# Patient Record
Sex: MALE | Race: WHITE | NOT HISPANIC OR LATINO | Employment: FULL TIME | URBAN - METROPOLITAN AREA
[De-identification: names, ages, dates, MRNs, and addresses within clinical notes are randomized per-mention and may not be internally consistent; named-entity substitution may affect disease eponyms.]

---

## 2023-10-16 ENCOUNTER — APPOINTMENT (OUTPATIENT)
Dept: RADIOLOGY | Facility: HOSPITAL | Age: 43
End: 2023-10-16

## 2023-10-16 ENCOUNTER — HOSPITAL ENCOUNTER (EMERGENCY)
Facility: HOSPITAL | Age: 43
Discharge: HOME | End: 2023-10-17

## 2023-10-16 DIAGNOSIS — N20.1 RIGHT URETERAL STONE: Primary | ICD-10-CM

## 2023-10-16 PROCEDURE — 83605 ASSAY OF LACTIC ACID: CPT | Performed by: PHYSICIAN ASSISTANT

## 2023-10-16 PROCEDURE — 36415 COLL VENOUS BLD VENIPUNCTURE: CPT | Performed by: PHYSICIAN ASSISTANT

## 2023-10-16 PROCEDURE — 85025 COMPLETE CBC W/AUTO DIFF WBC: CPT | Performed by: PHYSICIAN ASSISTANT

## 2023-10-16 PROCEDURE — 74176 CT ABD & PELVIS W/O CONTRAST: CPT | Performed by: RADIOLOGY

## 2023-10-16 PROCEDURE — 80053 COMPREHEN METABOLIC PANEL: CPT | Performed by: PHYSICIAN ASSISTANT

## 2023-10-16 PROCEDURE — 96374 THER/PROPH/DIAG INJ IV PUSH: CPT

## 2023-10-16 PROCEDURE — 96361 HYDRATE IV INFUSION ADD-ON: CPT

## 2023-10-16 PROCEDURE — 83690 ASSAY OF LIPASE: CPT | Performed by: PHYSICIAN ASSISTANT

## 2023-10-16 PROCEDURE — 99284 EMERGENCY DEPT VISIT MOD MDM: CPT | Mod: 25

## 2023-10-16 PROCEDURE — 2500000004 HC RX 250 GENERAL PHARMACY W/ HCPCS (ALT 636 FOR OP/ED): Performed by: PHYSICIAN ASSISTANT

## 2023-10-16 PROCEDURE — 74176 CT ABD & PELVIS W/O CONTRAST: CPT

## 2023-10-16 RX ORDER — KETOROLAC TROMETHAMINE 30 MG/ML
30 INJECTION, SOLUTION INTRAMUSCULAR; INTRAVENOUS ONCE
Status: COMPLETED | OUTPATIENT
Start: 2023-10-16 | End: 2023-10-16

## 2023-10-16 RX ADMIN — SODIUM CHLORIDE 1000 ML: 9 INJECTION, SOLUTION INTRAVENOUS at 23:54

## 2023-10-16 RX ADMIN — KETOROLAC TROMETHAMINE 30 MG: 30 INJECTION, SOLUTION INTRAMUSCULAR at 23:52

## 2023-10-16 ASSESSMENT — COLUMBIA-SUICIDE SEVERITY RATING SCALE - C-SSRS
1. IN THE PAST MONTH, HAVE YOU WISHED YOU WERE DEAD OR WISHED YOU COULD GO TO SLEEP AND NOT WAKE UP?: NO
2. HAVE YOU ACTUALLY HAD ANY THOUGHTS OF KILLING YOURSELF?: NO
6. HAVE YOU EVER DONE ANYTHING, STARTED TO DO ANYTHING, OR PREPARED TO DO ANYTHING TO END YOUR LIFE?: NO

## 2023-10-17 VITALS
HEART RATE: 55 BPM | TEMPERATURE: 97 F | RESPIRATION RATE: 18 BRPM | DIASTOLIC BLOOD PRESSURE: 78 MMHG | HEIGHT: 69 IN | OXYGEN SATURATION: 98 % | WEIGHT: 210 LBS | BODY MASS INDEX: 31.1 KG/M2 | SYSTOLIC BLOOD PRESSURE: 148 MMHG

## 2023-10-17 LAB
ALBUMIN SERPL BCP-MCNC: 4.1 G/DL (ref 3.4–5)
ALP SERPL-CCNC: 89 U/L (ref 33–120)
ALT SERPL W P-5'-P-CCNC: 50 U/L (ref 10–52)
ANION GAP SERPL CALC-SCNC: 12 MMOL/L (ref 10–20)
APPEARANCE UR: CLEAR
AST SERPL W P-5'-P-CCNC: 26 U/L (ref 9–39)
BASOPHILS # BLD AUTO: 0.12 X10*3/UL (ref 0–0.1)
BASOPHILS NFR BLD AUTO: 1.1 %
BILIRUB SERPL-MCNC: 0.5 MG/DL (ref 0–1.2)
BILIRUB UR STRIP.AUTO-MCNC: NEGATIVE MG/DL
BUN SERPL-MCNC: 18 MG/DL (ref 6–23)
CALCIUM SERPL-MCNC: 9 MG/DL (ref 8.6–10.3)
CHLORIDE SERPL-SCNC: 103 MMOL/L (ref 98–107)
CO2 SERPL-SCNC: 24 MMOL/L (ref 21–32)
COLOR UR: YELLOW
CREAT SERPL-MCNC: 1.27 MG/DL (ref 0.5–1.3)
EOSINOPHIL # BLD AUTO: 0.37 X10*3/UL (ref 0–0.7)
EOSINOPHIL NFR BLD AUTO: 3.4 %
ERYTHROCYTE [DISTWIDTH] IN BLOOD BY AUTOMATED COUNT: 11.9 % (ref 11.5–14.5)
GFR SERPL CREATININE-BSD FRML MDRD: 73 ML/MIN/1.73M*2
GLUCOSE SERPL-MCNC: 128 MG/DL (ref 74–99)
GLUCOSE UR STRIP.AUTO-MCNC: NEGATIVE MG/DL
HCT VFR BLD AUTO: 41.7 % (ref 41–52)
HGB BLD-MCNC: 14.3 G/DL (ref 13.5–17.5)
IMM GRANULOCYTES # BLD AUTO: 0.03 X10*3/UL (ref 0–0.7)
IMM GRANULOCYTES NFR BLD AUTO: 0.3 % (ref 0–0.9)
KETONES UR STRIP.AUTO-MCNC: NEGATIVE MG/DL
LACTATE SERPL-SCNC: 1 MMOL/L (ref 0.4–2)
LEUKOCYTE ESTERASE UR QL STRIP.AUTO: NEGATIVE
LIPASE SERPL-CCNC: 27 U/L (ref 9–82)
LYMPHOCYTES # BLD AUTO: 2.58 X10*3/UL (ref 1.2–4.8)
LYMPHOCYTES NFR BLD AUTO: 23.6 %
MCH RBC QN AUTO: 29.8 PG (ref 26–34)
MCHC RBC AUTO-ENTMCNC: 34.3 G/DL (ref 32–36)
MCV RBC AUTO: 87 FL (ref 80–100)
MONOCYTES # BLD AUTO: 0.91 X10*3/UL (ref 0.1–1)
MONOCYTES NFR BLD AUTO: 8.3 %
MUCOUS THREADS #/AREA URNS AUTO: ABNORMAL /LPF
NEUTROPHILS # BLD AUTO: 6.94 X10*3/UL (ref 1.2–7.7)
NEUTROPHILS NFR BLD AUTO: 63.3 %
NITRITE UR QL STRIP.AUTO: NEGATIVE
NRBC BLD-RTO: 0 /100 WBCS (ref 0–0)
PH UR STRIP.AUTO: 6 [PH]
PLATELET # BLD AUTO: 277 X10*3/UL (ref 150–450)
PMV BLD AUTO: 9.5 FL (ref 7.5–11.5)
POTASSIUM SERPL-SCNC: 3.9 MMOL/L (ref 3.5–5.3)
PROT SERPL-MCNC: 6.7 G/DL (ref 6.4–8.2)
PROT UR STRIP.AUTO-MCNC: NEGATIVE MG/DL
RBC # BLD AUTO: 4.8 X10*6/UL (ref 4.5–5.9)
RBC # UR STRIP.AUTO: ABNORMAL /UL
RBC #/AREA URNS AUTO: ABNORMAL /HPF
SODIUM SERPL-SCNC: 135 MMOL/L (ref 136–145)
SP GR UR STRIP.AUTO: 1.02
UROBILINOGEN UR STRIP.AUTO-MCNC: <2 MG/DL
WBC # BLD AUTO: 11 X10*3/UL (ref 4.4–11.3)
WBC #/AREA URNS AUTO: ABNORMAL /HPF

## 2023-10-17 PROCEDURE — 2500000004 HC RX 250 GENERAL PHARMACY W/ HCPCS (ALT 636 FOR OP/ED): Performed by: PHYSICIAN ASSISTANT

## 2023-10-17 PROCEDURE — 81003 URINALYSIS AUTO W/O SCOPE: CPT | Performed by: PHYSICIAN ASSISTANT

## 2023-10-17 PROCEDURE — 96375 TX/PRO/DX INJ NEW DRUG ADDON: CPT

## 2023-10-17 RX ORDER — OXYCODONE AND ACETAMINOPHEN 5; 325 MG/1; MG/1
1 TABLET ORAL EVERY 6 HOURS PRN
Qty: 12 TABLET | Refills: 0 | Status: SHIPPED | OUTPATIENT
Start: 2023-10-17 | End: 2023-10-20

## 2023-10-17 RX ORDER — TAMSULOSIN HYDROCHLORIDE 0.4 MG/1
0.4 CAPSULE ORAL NIGHTLY
Qty: 7 CAPSULE | Refills: 0 | Status: SHIPPED | OUTPATIENT
Start: 2023-10-17 | End: 2023-10-24

## 2023-10-17 RX ORDER — ONDANSETRON 4 MG/1
4 TABLET, ORALLY DISINTEGRATING ORAL EVERY 8 HOURS PRN
Qty: 9 TABLET | Refills: 0 | Status: SHIPPED | OUTPATIENT
Start: 2023-10-17 | End: 2023-10-20

## 2023-10-17 RX ORDER — MORPHINE SULFATE 4 MG/ML
4 INJECTION, SOLUTION INTRAMUSCULAR; INTRAVENOUS ONCE
Status: COMPLETED | OUTPATIENT
Start: 2023-10-17 | End: 2023-10-17

## 2023-10-17 RX ORDER — ONDANSETRON HYDROCHLORIDE 2 MG/ML
4 INJECTION, SOLUTION INTRAVENOUS ONCE
Status: COMPLETED | OUTPATIENT
Start: 2023-10-17 | End: 2023-10-17

## 2023-10-17 RX ADMIN — ONDANSETRON 4 MG: 2 INJECTION INTRAMUSCULAR; INTRAVENOUS at 00:31

## 2023-10-17 RX ADMIN — MORPHINE SULFATE 4 MG: 4 INJECTION, SOLUTION INTRAMUSCULAR; INTRAVENOUS at 00:46

## 2023-10-17 ASSESSMENT — PAIN DESCRIPTION - ORIENTATION: ORIENTATION: RIGHT

## 2023-10-17 ASSESSMENT — PAIN DESCRIPTION - PROGRESSION
CLINICAL_PROGRESSION: GRADUALLY IMPROVING
CLINICAL_PROGRESSION: GRADUALLY IMPROVING

## 2023-10-17 ASSESSMENT — LIFESTYLE VARIABLES
EVER FELT BAD OR GUILTY ABOUT YOUR DRINKING: NO
HAVE PEOPLE ANNOYED YOU BY CRITICIZING YOUR DRINKING: NO
EVER HAD A DRINK FIRST THING IN THE MORNING TO STEADY YOUR NERVES TO GET RID OF A HANGOVER: NO
HAVE YOU EVER FELT YOU SHOULD CUT DOWN ON YOUR DRINKING: NO
REASON UNABLE TO ASSESS: YES

## 2023-10-17 ASSESSMENT — PAIN SCALES - GENERAL
PAINLEVEL_OUTOF10: 3
PAINLEVEL_OUTOF10: 6
PAINLEVEL_OUTOF10: 3

## 2023-10-17 ASSESSMENT — PAIN - FUNCTIONAL ASSESSMENT
PAIN_FUNCTIONAL_ASSESSMENT: 0-10
PAIN_FUNCTIONAL_ASSESSMENT: 0-10

## 2023-10-17 ASSESSMENT — PAIN DESCRIPTION - PAIN TYPE: TYPE: ACUTE PAIN

## 2023-10-17 NOTE — ED PROVIDER NOTES
"HPI   Chief Complaint   Patient presents with    Abdominal Pain     Pt states he \"has had lower right quadrant abdominal pain for several hours, has a hx of kidney stones and this feels like that\"       43-year-old male, otherwise healthy not on any daily medication presenting to the ED today with right-sided abdominal pain that began around 3 PM this afternoon.  There was no fall or injury or foreign ingestion.  Patient states the pain is in his right lower abdomen and wraps around to the right lower side of his back.  He states he had the same pain 1 year ago and was told he was passing a kidney stone.  He is not in any previous abdominal surgeries.  He states sometimes the pain does make him feel nauseous but he has not vomited today.  He denies fever, diarrhea or constipation and he is passing gas.  He denies seeing any blood in the urine and denies feeling like he is urinating frequently or having painful urination.  He took ibuprofen earlier today with minimal relief of pain.  He does not use any alcohol daily and does not use any drugs.  No further complaints.      History provided by:  Patient                      No data recorded                Patient History   History reviewed. No pertinent past medical history.  History reviewed. No pertinent surgical history.  No family history on file.  Social History     Tobacco Use    Smoking status: Not on file    Smokeless tobacco: Not on file   Substance Use Topics    Alcohol use: Not on file    Drug use: Not on file       Physical Exam   ED Triage Vitals [10/16/23 2247]   Temp Heart Rate Resp BP   36.1 °C (97 °F) 51 18 145/83      SpO2 Temp Source Heart Rate Source Patient Position   96 % Temporal -- Sitting      BP Location FiO2 (%)     Right arm --       Physical Exam  Constitutional:       General: He is not in acute distress.     Appearance: He is well-developed. He is not toxic-appearing.   Cardiovascular:      Rate and Rhythm: Normal rate and regular " rhythm.   Pulmonary:      Effort: Pulmonary effort is normal.      Breath sounds: Normal breath sounds.   Abdominal:      Tenderness: There is no right CVA tenderness or left CVA tenderness.      Comments: Abdomen soft and nondistended with normal bowel sounds.  Tenderness in the right mid abdomen, right lower quadrant and right flank without guarding or rebound.  No hernia or mass.  No other abdominal tenderness on palpation, negative Palacios sign.  Negative CVA tenderness bilaterally   Musculoskeletal:      Comments: No lumbar spinal tenderness, step-offs or signs of trauma.  Mild tenderness in the right-sided lumbar paraspinal region.   Skin:     General: Skin is warm and dry.   Neurological:      Mental Status: He is alert and oriented to person, place, and time.         ED Course & MDM   Diagnoses as of 10/17/23 0104   Right ureteral stone       Medical Decision Making  43-year-old male, otherwise healthy without any previous abdominal surgeries presenting to the ED today with right-sided abdominal pain into the flank and back that started at 3 PM and has been constant.  It is associated with intermittent nausea.  No vomiting and he denies chest pain or shortness of breath.  He has not had any fever.  He has not noticed any dysuria or frequency or hematuria.  No testicular pain or swelling.  No further associated complaints and he arrives afebrile with stable vital signs.  He is resting on exam without signs of acute distress.  Heart RRR, lungs are clear and there is no CVA tenderness.  Abdomen soft and nondistended with normal bowel sounds and he is tender in the right mid abdomen, right lower quadrant or right flank without guarding or rebound or signs of trauma and no hernia or mass.  Labs and urinalysis and CT was ordered as well as Toradol and fluids.    CBC with stable H&H and no leukocytosis.  CMP without acute electrolyte abnormality or renal insufficiency and lactic is 1.0.  Urinalysis today with  hematuria but no evidence of infection.  Labs at this time are otherwise within normal limits.  CT abdomen and pelvis shows a 5 mm calculus in the proximal right ureter with mild right hydronephrosis.  Patient did become nauseous so a dose of Zofran was ordered for him.  On reassessment patient is still having pain so I did order IV morphine.    After morphine patient is resting comfortably and feels improved.  I did discuss results, diagnosis and treatment plan with him and with family present.  He will be discharged home on Flomax, Zofran and Percocet as needed for severe pain.  He will be given follow-up with urology and I also carefully outlined warning signs to return to the ER and he expressed understanding and agreed with the plan of care today.      Labs Reviewed   CBC WITH AUTO DIFFERENTIAL - Abnormal       Result Value    WBC 11.0      nRBC 0.0      RBC 4.80      Hemoglobin 14.3      Hematocrit 41.7      MCV 87      MCH 29.8      MCHC 34.3      RDW 11.9      Platelets 277      MPV 9.5      Neutrophils % 63.3      Immature Granulocytes %, Automated 0.3      Lymphocytes % 23.6      Monocytes % 8.3      Eosinophils % 3.4      Basophils % 1.1      Neutrophils Absolute 6.94      Immature Granulocytes Absolute, Automated 0.03      Lymphocytes Absolute 2.58      Monocytes Absolute 0.91      Eosinophils Absolute 0.37      Basophils Absolute 0.12 (*)    COMPREHENSIVE METABOLIC PANEL - Abnormal    Glucose 128 (*)     Sodium 135 (*)     Potassium 3.9      Chloride 103      Bicarbonate 24      Anion Gap 12      Urea Nitrogen 18      Creatinine 1.27      eGFR 73      Calcium 9.0      Albumin 4.1      Alkaline Phosphatase 89      Total Protein 6.7      AST 26      Bilirubin, Total 0.5      ALT 50     URINALYSIS WITH REFLEX MICROSCOPIC AND CULTURE - Abnormal    Color, Urine Yellow      Appearance, Urine Clear      Specific Gravity, Urine 1.020      pH, Urine 6.0      Protein, Urine NEGATIVE      Glucose, Urine NEGATIVE       Blood, Urine SMALL (1+) (*)     Ketones, Urine NEGATIVE      Bilirubin, Urine NEGATIVE      Urobilinogen, Urine <2.0      Nitrite, Urine NEGATIVE      Leukocyte Esterase, Urine NEGATIVE     URINALYSIS MICROSCOPIC WITH REFLEX CULTURE - Abnormal    WBC, Urine NONE      RBC, Urine 11-20 (*)     Mucus, Urine 1+     LIPASE - Normal    Lipase 27      Narrative:     Venipuncture immediately after or during the administration of Metamizole may lead to falsely low results. Testing should be performed immediately prior to Metamizole dosing.   LACTATE - Normal    Lactate 1.0      Narrative:     Venipuncture immediately after or during the administration of Metamizole may lead to falsely low results. Testing should be performed immediately  prior to Metamizole dosing.   URINALYSIS WITH REFLEX MICROSCOPIC AND CULTURE    Narrative:     The following orders were created for panel order Urinalysis with Reflex Microscopic and Culture.  Procedure                               Abnormality         Status                     ---------                               -----------         ------                     Urinalysis with Reflex Mi...[[id number]]  Abnormal            Final result               Extra Urine Gray Tube[[id number]]                                                          Please view results for these tests on the individual orders.   EXTRA URINE GRAY TUBE       CT abdomen pelvis wo IV contrast   Final Result   5 mm calculus in the proximal right ureter with mild right   hydronephrosis.                  MACRO:   None        Signed by: Joan Nair 10/17/2023 12:14 AM   Dictation workstation:   [id number]            Procedure  Procedures     Mariela Tavares PA-C  10/17/23 0109

## 2023-10-18 LAB — HOLD SPECIMEN: NORMAL
